# Patient Record
Sex: MALE | Race: WHITE
[De-identification: names, ages, dates, MRNs, and addresses within clinical notes are randomized per-mention and may not be internally consistent; named-entity substitution may affect disease eponyms.]

---

## 2018-09-12 ENCOUNTER — HOSPITAL ENCOUNTER (OUTPATIENT)
Dept: HOSPITAL 80 - FED | Age: 33
End: 2018-09-12
Attending: SURGERY
Payer: COMMERCIAL

## 2018-09-12 VITALS — SYSTOLIC BLOOD PRESSURE: 128 MMHG | DIASTOLIC BLOOD PRESSURE: 83 MMHG

## 2018-09-12 DIAGNOSIS — K35.80: Primary | ICD-10-CM

## 2018-09-12 DIAGNOSIS — E86.9: ICD-10-CM

## 2018-09-12 LAB — PLATELET # BLD: 288 10^3/UL (ref 150–400)

## 2018-09-12 PROCEDURE — 0DTJ4ZZ RESECTION OF APPENDIX, PERCUTANEOUS ENDOSCOPIC APPROACH: ICD-10-PCS | Performed by: SURGERY

## 2018-09-12 NOTE — PDANEPAE
ANE History of Present Illness





appendicitis





ANE Past Medical History





- Pulmonary History


Hx Oxygen in Use at Home: No





- Endocrine History


Hx Diabetes: No





ANE Review of Systems


Review of Systems: 








ANE Patient History





- Allergies


Allergies/Adverse Reactions: 








No Known Allergies Allergy (Unverified 09/12/18 01:59)


 








- NPO status


NPO Since - Liquids (Date): 09/12/18


NPO Since - Liquids (Time): 00:00


NPO Since - Solids (Date): 09/11/18


NPO Since - Solids (Time): 22:00





- Smoking Hx


Smoking Status: Never smoked





ANE Labs/Vital Signs





- Labs


Result Diagrams: 


 09/12/18 02:10





 09/12/18 02:10





- Vital Signs


Blood Pressure: 121/73


Heart Rate: 71


Respiratory Rate: 16


O2 Sat (%): 96


Height: 175.26 cm


Weight: 79.379 kg





ANE Physical Exam





- Airway


Neck exam: FROM


Mallampati Score: Class 1


Mouth exam: normal dental/mouth exam





- Pulmonary


Pulmonary: no respiratory distress





- Cardiovascular


Cardiovascular: regular rate and rhythym





- ASA Status


ASA Status: II, E





ANE Anesthesia Plan


Anesthesia Plan: general endotracheal anesthesia

## 2018-09-12 NOTE — POSTOPPROG
Post Op Note


Date of Operation: 09/12/18


Surgeon: Satish Hay


Assistant: none


Anesthesiologist: Hillary


Anesthesia: GET(General Endotracheal)


Pre-op Diagnosis: Acute appendicitis


Post-op Diagnosis: same


Procedure: Lap Appy


Findings: Inflammed no rupture


Inf/Abcess present in the surg proc area at time of surgery?: Yes


Depth: Organ Space


EBL: Minimal


Specimen(s): 





appendix

## 2018-09-12 NOTE — PDCONSULT
Consultant Note: 





Request by Dr. Gonzalez for right lower quadrant abdominal pain.





Chief complaint right lower quadrant abdominal pain worsening over the course 

of last 2 days





History of present illness:


This is a 33-year-old gentleman with a history of central abdominal pain 

migrating to the right lower quadrant.  Peritoneal signs on the way into the 

hospital.  Last meal 10 o'clock last night.  Denies nausea vomiting diarrhea.  

No prior episodes.  No sick contacts.





Past medical history:  Craniosynostosis





Past surgical history:  Right ulnar nerve transposition, correction of 

craniosynostosis





Family history:  Noncontributory





Social history:  On minimal alcohol, denies smoking





Review of systems:  Abdominal pain.  Left calf muscle tear 3 months ago.  all 

otherwise all others reviewed and are negative

















Temp Pulse Resp BP Pulse Ox


 


 37 C   71   16   121/73 H  96 


 


 09/12/18 05:28  09/12/18 05:28  09/12/18 05:28  09/12/18 05:28  09/12/18 05:28








Alert oriented minimal distress


Sclerae anicteric


Oropharynx moist dentition in good repair


No JVD or thyromegaly


Regular rate and rhythm


Clear to auscultation bilaterally


Abdomen tender right lower quadrant maximal tenderness at McBurney's point.  No 

rebound.


Extremities without edema


Intact central and peripheral pulses


Normal mood and affect


Skin turgor and tone normal





 





 09/12/18 02:10 





 09/12/18 02:10 





Ultrasound personally reviewed demonstrating noncompressible tubular structure 

in the right lower quadrant consistent with acute appendicitis





Impression/plan:  33-year-old trouble with a acute appendicitis leukocytosis 

suggestive of acute appendicitis has received antibiotics in the emergency room 

for treatment of this problem.  Risks benefits and alternatives to laparoscopic 

appendectomy have been outlined to the patient and his significant other.  

Verbal confirmation of understanding was obtained prior to written consent.  

Reasonable recovery expectations outlined also.  All questions were addressed.

## 2018-09-12 NOTE — GOP
DATE OF OPERATION:  



SURGEON:  Satish Hay MD



ANESTHESIA:  General.



ANESTHESIOLOGIST:  Dr. Thornton.



PREOPERATIVE DIAGNOSIS:  Acute appendicitis.



POSTOPERATIVE DIAGNOSIS:  Acute appendicitis.



PROCEDURE PERFORMED:  Laparoscopic appendectomy



FINDINGS:  An inflamed but not ruptured appendix.



SPECIMENS:  Appendix to permanent pathology.



ESTIMATED BLOOD LOSS:  15 mL.



INDICATIONS:  This is a 33-year-old gentleman, who presents with acute appendicitis.



DESCRIPTION OF PROCEDURE:  The patient is brought into the operating room, and, after induction of en
dotracheal anesthesia, in the supine position, the abdomen is prepped with chlorhexidine and draped s
terilely.  Time-out procedure is then performed according to institutional standards.  Local anesthet
ic is infused in the skin and subcutaneous tissue of the trocar sites, and open supraumbilical trocar
 placement is done in the standard fashion.  The working trocars are placed in the lower midline.  Th
e abdomen is insufflated to 15 TOR with carbon dioxide.  



The appendix is underneath the terminal ileum, and gentle dissection is used to mobilize the terminal
 ileum and the right colon to allow dissection of the appendix from the tip to the base.  LigaSure bi
polar energy is used to control the mesentery, and the base of the appendix is divided flush with the
 cecum using an Endo ARIS stapler.  The area is ensured to be hemostatic, and the appendix is placed i
nto an Endopouch before bringing it out through the umbilical incision.  



The needle, instrument, and sponge counts are correct.  The fascia is then reapproximated using 0 Dionicio
ryl.  All 3 ports are reapproximated at the skin level using 4-0 Monocryl.  Dermabond is applied.  Th
e patient is awakened, extubated, taken to the recovery room in stable condition.  No immediate compl
ications.



COMPLICATIONS:  There were no complications.





Job #:  [id number]

## 2018-09-12 NOTE — EDPHY
H & P


Stated Complaint: abd pain, nausea, RLQ


Time Seen by Provider: 09/12/18 02:05


HPI/ROS: 





Chief Complaint:  Abdominal pain





HPI:  33-year-old male presenting with 2 days of worsening abdominal pain.  

Pain started in his central abdomen is now migrated to the right lower 

quadrant.  This morning is developed some nausea and vomiting.  Pain is worse 

with movement better, improves with rest.  No diarrhea or constipation.  No 

fevers or chills.  Does not have a history of the same.  Right now the pain is 

about a 6/10.  Is worse when he presses on his lower abdomen.  Is also made 

worse by the bumps in the car.  He a stat something to eat at 10:00 p.m., has 

been taking sips of water up until about an hour ago.





ROS:  10 systems were reviewed and were negative except those elements noted in 

the HPI.





PMH:  Denies





Social History: No smoking





Family History: non-contributory





Physical Exam:


Gen: Awake, Alert, No Distress


HEENT:  


     Nose: no rhinorrhea


     Eyes: PERRLA, EOMI


     Mouth: Moist mucosa 


Neck: Supple, no JVD


Chest: nontender, lungs clear to auscultation


Heart: S1, S2 normal, no murmur


Abd: Soft, patient has right lower quadrant tenderness with voluntary guarding, 

positive Rovsing sign, no guarding


Back: no CVA tenderness, no midline tenderness 


Ext: no edema, non-tender


Skin: no rash


Neuro: CN II-XII intact, Sensation grossly intact, Strength 5/5 in bilateral 

upper and lower extremities








- Personal History


Current Tetanus Diphtheria and Acellular Pertussis (TDAP): Yes





- Medical/Surgical History


Hx Asthma: No


Hx Chronic Respiratory Disease: No


Hx Diabetes: No


Hx Cardiac Disease: No


Hx Renal Disease: No


Hx Cirrhosis: No


Hx Alcoholism: No


Hx HIV/AIDS: No


Hx Splenectomy or Spleen Trauma: No





- Social History


Smoking Status: Never smoked


Constitutional: 


 Initial Vital Signs











Temperature (C)  37 C   09/12/18 01:59


 


Heart Rate  100   09/12/18 01:59


 


Respiratory Rate  18   09/12/18 01:59


 


Blood Pressure  117/81 H  09/12/18 01:59


 


O2 Sat (%)  97   09/12/18 01:59











Allergies/Adverse Reactions: 


 





No Known Allergies Allergy (Unverified 09/12/18 01:59)


 








Home Medications: 














 Medication  Instructions  Recorded


 


NK [No Known Home Meds]  09/12/18














Medical Decision Making





- Diagnostics


Imaging Results: 


Ultrasound is positive for acute appendicitis which is 12 mm with small amount 

of fluid in the positive appendicular with.  No abscess or perforation evident.

  Study interpreted by Dr. Franklin.


Imaging: Discussed imaging studies w/ On call Radiologist


ED Course/Re-evaluation: 





Abdominal ultrasound is positive for acute appendicitis.  The surgeon has been 

paged.  I have ordered ceftriaxone and Flagyl.





- Data Points


Laboratory Results: 


 Laboratory Results





 09/12/18 02:10 





 09/12/18 02:10 





 











  09/12/18 09/12/18





  02:10 02:10


 


WBC    14.84 10^3/uL H 10^3/uL





    (3.80-9.50) 


 


RBC    5.41 10^6/uL 10^6/uL





    (4.40-6.38) 


 


Hgb    16.2 g/dL g/dL





    (13.7-17.5) 


 


Hct    46.3 % %





    (40.0-51.0) 


 


MCV    85.6 fL fL





    (81.5-99.8) 


 


MCH    29.9 pg pg





    (27.9-34.1) 


 


MCHC    35.0 g/dL g/dL





    (32.4-36.7) 


 


RDW    12.1 % %





    (11.5-15.2) 


 


Plt Count    288 10^3/uL 10^3/uL





    (150-400) 


 


MPV    11.0 fL fL





    (8.7-11.7) 


 


Neut % (Auto)    76.8 % H %





    (39.3-74.2) 


 


Lymph % (Auto)    15.8 % %





    (15.0-45.0) 


 


Mono % (Auto)    6.1 % %





    (4.5-13.0) 


 


Eos % (Auto)    0.7 % %





    (0.6-7.6) 


 


Baso % (Auto)    0.3 % %





    (0.3-1.7) 


 


Nucleat RBC Rel Count    0.0 % %





    (0.0-0.2) 


 


Absolute Neuts (auto)    11.38 10^3/uL H 10^3/uL





    (1.70-6.50) 


 


Absolute Lymphs (auto)    2.35 10^3/uL 10^3/uL





    (1.00-3.00) 


 


Absolute Monos (auto)    0.91 10^3/uL H 10^3/uL





    (0.30-0.80) 


 


Absolute Eos (auto)    0.11 10^3/uL 10^3/uL





    (0.03-0.40) 


 


Absolute Basos (auto)    0.04 10^3/uL 10^3/uL





    (0.02-0.10) 


 


Absolute Nucleated RBC    0.00 10^3/uL 10^3/uL





    (0-0.01) 


 


Immature Gran %    0.3 % %





    (0.0-1.1) 


 


Immature Gran #    0.05 10^3/uL 10^3/uL





    (0.00-0.10) 


 


Sodium  141 mEq/L mEq/L  





   (135-145)  


 


Potassium  3.6 mEq/L mEq/L  





   (3.3-5.0)  


 


Chloride  104 mEq/L mEq/L  





   ()  


 


Carbon Dioxide  27 mEq/l mEq/l  





   (22-31)  


 


Anion Gap  10 mEq/L mEq/L  





   (8-16)  


 


BUN  16 mg/dL mg/dL  





   (7-23)  


 


Creatinine  0.9 mg/dL mg/dL  





   (0.7-1.3)  


 


Estimated GFR  > 60   





   


 


Glucose  108 mg/dL H mg/dL  





   ()  


 


Calcium  9.8 mg/dL mg/dL  





   (8.5-10.4)  











Medications Given: 


 








Discontinued Medications





Sodium Chloride (Ns)  1,000 mls @ 0 mls/hr IV EDNOW ONE; [facility]


   PRN Reason: Protocol


   Stop: 09/12/18 02:17


   Last Admin: 09/12/18 02:16 Dose:  1,000 mls


Ondansetron HCl (Zofran)  4 mg IVP EDNOW ONE


   Stop: 09/12/18 02:17


   Last Admin: 09/12/18 02:16 Dose:  4 mg


Ondansetron HCl (Zofran)  4 mg IVP EDNOW ONE


   Stop: 09/12/18 02:16


   Last Admin: 09/12/18 02:20 Dose:  Not Given








Departure





- Departure


Disposition: [facility]


Clinical Impression: 


 Acute appendicitis





Condition: Fair


Referrals: 


NONE *PRIMARY CARE P,. [Primary Care Provider] - As per Instructions